# Patient Record
Sex: FEMALE | Race: AMERICAN INDIAN OR ALASKA NATIVE | NOT HISPANIC OR LATINO | Employment: FULL TIME | URBAN - METROPOLITAN AREA
[De-identification: names, ages, dates, MRNs, and addresses within clinical notes are randomized per-mention and may not be internally consistent; named-entity substitution may affect disease eponyms.]

---

## 2024-09-19 ENCOUNTER — APPOINTMENT (OUTPATIENT)
Dept: GENERAL RADIOLOGY | Facility: CLINIC | Age: 49
End: 2024-09-19
Attending: EMERGENCY MEDICINE
Payer: OTHER MISCELLANEOUS

## 2024-09-19 ENCOUNTER — HOSPITAL ENCOUNTER (EMERGENCY)
Facility: CLINIC | Age: 49
Discharge: HOME | End: 2024-09-20
Attending: EMERGENCY MEDICINE | Admitting: EMERGENCY MEDICINE
Payer: OTHER MISCELLANEOUS

## 2024-09-19 DIAGNOSIS — S52.92XA CLOSED FRACTURE OF LEFT FOREARM, INITIAL ENCOUNTER: ICD-10-CM

## 2024-09-19 PROCEDURE — 25605 CLTX DST RDL FX/EPHYS SEP W/: CPT | Mod: LT

## 2024-09-19 PROCEDURE — 73060 X-RAY EXAM OF HUMERUS: CPT | Mod: LT

## 2024-09-19 PROCEDURE — 99285 EMERGENCY DEPT VISIT HI MDM: CPT | Mod: 25

## 2024-09-19 PROCEDURE — 96375 TX/PRO/DX INJ NEW DRUG ADDON: CPT

## 2024-09-19 PROCEDURE — 250N000011 HC RX IP 250 OP 636: Performed by: EMERGENCY MEDICINE

## 2024-09-19 PROCEDURE — 73090 X-RAY EXAM OF FOREARM: CPT | Mod: LT

## 2024-09-19 PROCEDURE — 96374 THER/PROPH/DIAG INJ IV PUSH: CPT

## 2024-09-19 PROCEDURE — 73110 X-RAY EXAM OF WRIST: CPT | Mod: LT

## 2024-09-19 PROCEDURE — 96376 TX/PRO/DX INJ SAME DRUG ADON: CPT

## 2024-09-19 RX ORDER — MORPHINE SULFATE 4 MG/ML
4 INJECTION, SOLUTION INTRAMUSCULAR; INTRAVENOUS
Status: COMPLETED | OUTPATIENT
Start: 2024-09-19 | End: 2024-09-19

## 2024-09-19 RX ORDER — ONDANSETRON 2 MG/ML
4 INJECTION INTRAMUSCULAR; INTRAVENOUS EVERY 30 MIN PRN
Status: DISCONTINUED | OUTPATIENT
Start: 2024-09-19 | End: 2024-09-20 | Stop reason: HOSPADM

## 2024-09-19 RX ORDER — MORPHINE SULFATE 4 MG/ML
4 INJECTION, SOLUTION INTRAMUSCULAR; INTRAVENOUS
Status: COMPLETED | OUTPATIENT
Start: 2024-09-19 | End: 2024-09-20

## 2024-09-19 RX ADMIN — ONDANSETRON 4 MG: 2 INJECTION INTRAMUSCULAR; INTRAVENOUS at 22:58

## 2024-09-19 RX ADMIN — MORPHINE SULFATE 4 MG: 4 INJECTION, SOLUTION INTRAMUSCULAR; INTRAVENOUS at 22:59

## 2024-09-19 ASSESSMENT — ACTIVITIES OF DAILY LIVING (ADL)
ADLS_ACUITY_SCORE: 35
ADLS_ACUITY_SCORE: 35

## 2024-09-19 ASSESSMENT — COLUMBIA-SUICIDE SEVERITY RATING SCALE - C-SSRS
1. IN THE PAST MONTH, HAVE YOU WISHED YOU WERE DEAD OR WISHED YOU COULD GO TO SLEEP AND NOT WAKE UP?: NO
6. HAVE YOU EVER DONE ANYTHING, STARTED TO DO ANYTHING, OR PREPARED TO DO ANYTHING TO END YOUR LIFE?: NO
2. HAVE YOU ACTUALLY HAD ANY THOUGHTS OF KILLING YOURSELF IN THE PAST MONTH?: NO

## 2024-09-20 ENCOUNTER — APPOINTMENT (OUTPATIENT)
Dept: GENERAL RADIOLOGY | Facility: CLINIC | Age: 49
End: 2024-09-20
Attending: EMERGENCY MEDICINE
Payer: OTHER MISCELLANEOUS

## 2024-09-20 VITALS
DIASTOLIC BLOOD PRESSURE: 90 MMHG | HEART RATE: 84 BPM | SYSTOLIC BLOOD PRESSURE: 142 MMHG | TEMPERATURE: 98.4 F | OXYGEN SATURATION: 96 % | HEIGHT: 66 IN | BODY MASS INDEX: 40.82 KG/M2 | WEIGHT: 254 LBS | RESPIRATION RATE: 15 BRPM

## 2024-09-20 PROCEDURE — 999N000179 XR SURGERY CARM FLUORO LESS THAN 5 MIN W STILLS

## 2024-09-20 PROCEDURE — 250N000011 HC RX IP 250 OP 636: Performed by: EMERGENCY MEDICINE

## 2024-09-20 RX ORDER — PROPOFOL 10 MG/ML
INJECTION, EMULSION INTRAVENOUS DAILY PRN
Status: COMPLETED | OUTPATIENT
Start: 2024-09-20 | End: 2024-09-20

## 2024-09-20 RX ORDER — PROPOFOL 10 MG/ML
200 INJECTION, EMULSION INTRAVENOUS
Status: COMPLETED | OUTPATIENT
Start: 2024-09-20 | End: 2024-09-20

## 2024-09-20 RX ORDER — OXYCODONE HYDROCHLORIDE 5 MG/1
5 TABLET ORAL EVERY 6 HOURS PRN
Qty: 12 TABLET | Refills: 0 | Status: SHIPPED | OUTPATIENT
Start: 2024-09-20 | End: 2024-09-23

## 2024-09-20 RX ORDER — ONDANSETRON 4 MG/1
4 TABLET, ORALLY DISINTEGRATING ORAL ONCE
Status: COMPLETED | OUTPATIENT
Start: 2024-09-20 | End: 2024-09-20

## 2024-09-20 RX ADMIN — ONDANSETRON 4 MG: 4 TABLET, ORALLY DISINTEGRATING ORAL at 06:36

## 2024-09-20 RX ADMIN — PROPOFOL 20 MG: 10 INJECTION, EMULSION INTRAVENOUS at 00:24

## 2024-09-20 RX ADMIN — PROPOFOL 40 MG: 10 INJECTION, EMULSION INTRAVENOUS at 00:26

## 2024-09-20 RX ADMIN — PROPOFOL 40 MG: 10 INJECTION, EMULSION INTRAVENOUS at 00:14

## 2024-09-20 RX ADMIN — PROPOFOL 40 MG: 10 INJECTION, EMULSION INTRAVENOUS at 00:13

## 2024-09-20 RX ADMIN — PROPOFOL 20 MG: 10 INJECTION, EMULSION INTRAVENOUS at 00:22

## 2024-09-20 RX ADMIN — PROPOFOL 40 MG: 10 INJECTION, EMULSION INTRAVENOUS at 00:17

## 2024-09-20 RX ADMIN — PROPOFOL 10 MG: 10 INJECTION, EMULSION INTRAVENOUS at 00:19

## 2024-09-20 RX ADMIN — PROPOFOL 40 MG: 10 INJECTION, EMULSION INTRAVENOUS at 00:15

## 2024-09-20 RX ADMIN — MORPHINE SULFATE 4 MG: 4 INJECTION, SOLUTION INTRAMUSCULAR; INTRAVENOUS at 00:00

## 2024-09-20 ASSESSMENT — ACTIVITIES OF DAILY LIVING (ADL)
ADLS_ACUITY_SCORE: 35

## 2024-09-20 NOTE — ED PROVIDER NOTES
"  Emergency Department Note      History of Present Illness     Chief Complaint   Wrist Pain    HPI   Carol Ann Michael is a 49 year old female who presents to the ER for left wrist pain. Patient reports missing a step and falling onto her left wrist. She states that her whole arm is in pain and that the base of her wrist hurts the most. She recalls being given 150 mcg of fentanyl by EMS and denies hitting her head or blood thinners use.     Independent Historian   None    Review of External Notes   None    Past Medical History     Medical History and Problem List   The patient denies any significant past medical history.     Medications   The patient is not currently taking any regular medications.    Physical Exam     Patient Vitals for the past 24 hrs:   BP Temp Temp src Pulse Resp SpO2 Height Weight   09/20/24 0045 (!) 159/91 -- -- 85 13 99 % -- --   09/20/24 0035 133/84 -- -- 87 17 99 % -- --   09/20/24 0030 (!) 149/89 -- -- 92 19 98 % -- --   09/20/24 0020 (!) 142/89 -- -- 93 21 100 % -- --   09/20/24 0010 (!) 142/85 -- -- 81 11 99 % -- --   09/19/24 2215 -- -- -- -- -- 99 % -- --   09/19/24 2156 (!) 144/116 98.4  F (36.9  C) Oral 80 20 98 % 1.676 m (5' 6\") 115.2 kg (254 lb)     Physical Exam  General: Laying on the ED bed  HEENT: Normocephalic, atraumatic  Cardiac: Warm and well perfused, regular rate and rhythm, left radial 2+  Pulm: Breathing comfortably, no accessory muscle usage, no conversational dyspnea  MSK: There is bony deformity of the left distal forearm and corresponding tenderness to palpation.  2+ capillary refill to the left hand fingers.  Skin: Warm and dry  Neuro: Sensation intact to the distal left hand fingers.  Difficulty moving left hand fingers.  Psych: Pleasant mood and affect      Diagnostics     Lab Results   Labs Ordered and Resulted from Time of ED Arrival to Time of ED Departure - No data to display    Imaging   Humerus XR,  G/E 2 views, left   Final Result   IMPRESSION: Distal " radius fracture with marked dorsal displacement of the distal fragment with overriding. Fracture involves the distal radial ulnar junction and there is malalignment distal ulna relative to the distal radius.      No fracture in the proximal radius or ulna. No fracture of the humerus.      Radius/Ulna XR,  PA &LAT, left   Final Result   IMPRESSION: Distal radius fracture with marked dorsal displacement of the distal fragment with overriding. Fracture involves the distal radial ulnar junction and there is malalignment distal ulna relative to the distal radius.      No fracture in the proximal radius or ulna. No fracture of the humerus.      XR Wrist Left G/E 3 Views   Final Result   IMPRESSION: Distal radius fracture with marked dorsal displacement of the distal fragment with overriding. Fracture involves the distal radial ulnar junction and there is malalignment distal ulna relative to the distal radius.      No fracture in the proximal radius or ulna. No fracture of the humerus.      XR Surgery OVI L/T 5 Min Fluoro w Stills    (Results Pending)     Independent Interpretation   X-ray left wrist shows a distal left radial fracture and distraction of the ulna from the radius distally    ED Course      Medications Administered   Medications   ondansetron (ZOFRAN) injection 4 mg (4 mg Intravenous $Given 9/19/24 2258)   propofol (DIPRIVAN) injection 10 mg/mL vial (has no administration in time range)   propofol (DIPRIVAN) injection 10 mg/mL vial (40 mg Intravenous $Given 9/20/24 0026)   morphine (PF) injection 4 mg (4 mg Intravenous $Given 9/19/24 2259)   morphine (PF) injection 4 mg (4 mg Intravenous $Given 9/20/24 0000)       Procedures     Splint Placement     Procedure: Splint Placement     Indication: Fracture and Pain    Consent: Verbal     Location: Left Wrist    Preparation: Wounds were cleansed and dressed with a non-adherent bandage     Procedure detail:   Splint was applied by Tech/Nurse with my  assistance  Splint type: Sugar-tong   Splint materilal: Plaster  After placement I checked and adjusted the fit as needed to ensure proper positioning/fit.   Sensation and circulation are intact after splint placement.     Patient Status: The patient tolerated the procedure well: Yes. There were no complications.     Sedation     Procedure: Procedural Sedation    Sedation Level: Moderate    Indication: Fracture reduction    Consent: Verbal from Patient     Universal protocol: Universal protocol was followed and time out conducted just prior to starting procedure, confirming patient identity, site/side, procedure, patient position, and availability of correct equipment and implants.     Last PO Intake: Emergent procedure    ASA Class: Class 1 - A normal healthy patient     Exam:  Mallampati:  Grade 3 - Soft palate and base of uvula present   Lungs: Clear   Heart: Regular rate and rhythm     Medication: Propofol  Dose: 250 mg     Monitoring:  Monitoring consisted of heart rate, cardiac, continuous pulse oximetry, frequent blood pressure checks.   There was constant attendance by RN until patient recovered and constant attendance by physician until patient stable.   Intubation and emergency airway equipment available.     Response: Vital signs stable, oxygen saturations greater than 92%       Patient Status: Post procedure patient was alert.     Total Physician Drug Administration / Monitoring Time: 15 minutes.     Patient was monitored during recovery and returned to pre-procedure baseline.        Fracture Reduction     Procedure: Fracture Reduction     Indication: Left distal forearm fracture    Location: Left wrist    Consent: Written from Patient   Risks (including but not limited to: neurologic compromise, vascular injury, pain, and inability to reduce fracture), benefits, and alternatives were discussed with patient and consent for procedure was obtained.     Universal Protocol: Universal protocol was followed  and time out conducted just prior to starting procedure, confirming patient identity, site/side, procedure, patient position, and availability of correct equipment and implants.     Anesthesia/Sedation: Sedation: The patient was sedated, see separate procedure note for details.     Procedure Detail: I manually manipulated and reduced the fracture. C-arm was used.    Immobilized with: Custom splint (See separate procedure note)  Post-reduction x-ray showed adequate reduction   Post-procedure distal neurovascular function was intact.     Patient Status:  The patient tolerated the procedure well: Yes. There were no complications.      Discussion of Management   None    ED Course   ED Course as of 09/20/24 0051   Thu Sep 19, 2024   2210 I obtained the history and examined the patient as noted above.    2333 I rechecked patient and explained findings and plan of care.         Additional Documentation  None    Medical Decision Making / Diagnosis     CMS Diagnoses: None    MIPS       None    MDM   Carol Ann Michael is a 49 year old female who presents after mechanical fall and resultant left wrist injury as above.  X-ray confirms a left wrist fracture as described above.  The patient was unable to move her fingers on my initial evaluation, though she was neurovascularly intact.  Sedation proceeded as above and the patient was able to move her left hand fingers after reduction.  I suspect the initial difficulty was due to mechanical stretch of the flexor tendons with the amount of dorsal displacement of the distal radius fracture.  With the improvement in her motor exam, I do think that she is stable for further fracture management in the outpatient setting.  The patient was waking up from sedation at the time of signout, signed out to my partner as below pending discharge after she has passed a trial of ambulation and p.o. challenge.  Plan is for outpatient follow-up with the orthopedic clinic.    Disposition   The patient  was signed out to my partner Dr. Tinsley pending p.o. challenge and trial ambulation post sedation with anticipation of discharge thereafter.    Diagnosis     ICD-10-CM    1. Closed fracture of left forearm, initial encounter  S52.92XA            Discharge Medications   New Prescriptions    OXYCODONE (ROXICODONE) 5 MG TABLET    Take 1 tablet (5 mg) by mouth every 6 hours as needed for pain.         Scribe Disclosure:  I, Lester Kennedy, am serving as a scribe at 10:15 PM on 9/19/2024 to document services personally performed by Keenan Barriga MD based on my observations and the provider's statements to me.        Keenan Barriga MD  09/20/24 0051

## 2024-09-20 NOTE — ED NOTES
Bed: ED01  Expected date:   Expected time:   Means of arrival:   Comments:  [facility] 49F left wrist deformity, fentanyl given

## 2024-09-20 NOTE — ED TRIAGE NOTES
Pt BIBA from work after falling while missing step. Pt attempted to brace fall with L hand. Pt has swelling and severe pain in L wrist. EMS noted wrist looked deformed when they arrived to scene. Karl splint in place. EMS gave total of 150mcg fentanyl.      Triage Assessment (Adult)       Row Name 09/19/24 4247          Triage Assessment    Airway WDL WDL        Respiratory WDL    Respiratory WDL WDL        Cardiac WDL    Cardiac WDL WDL        Peripheral/Neurovascular WDL    Peripheral Neurovascular WDL WDL        Cognitive/Neuro/Behavioral WDL    Cognitive/Neuro/Behavioral WDL WDL

## 2024-09-25 ENCOUNTER — OFFICE VISIT (OUTPATIENT)
Dept: FAMILY MEDICINE | Facility: CLINIC | Age: 49
End: 2024-09-25

## 2024-09-25 VITALS
HEART RATE: 80 BPM | TEMPERATURE: 97.5 F | SYSTOLIC BLOOD PRESSURE: 128 MMHG | WEIGHT: 255 LBS | HEIGHT: 66 IN | DIASTOLIC BLOOD PRESSURE: 84 MMHG | RESPIRATION RATE: 20 BRPM | BODY MASS INDEX: 40.98 KG/M2

## 2024-09-25 DIAGNOSIS — K21.00 GASTROESOPHAGEAL REFLUX DISEASE WITH ESOPHAGITIS, UNSPECIFIED WHETHER HEMORRHAGE: ICD-10-CM

## 2024-09-25 DIAGNOSIS — Z91.09 ENVIRONMENTAL ALLERGIES: ICD-10-CM

## 2024-09-25 DIAGNOSIS — Z01.818 PREOPERATIVE EXAMINATION: Primary | ICD-10-CM

## 2024-09-25 DIAGNOSIS — Z71.89 ACP (ADVANCE CARE PLANNING): ICD-10-CM

## 2024-09-25 DIAGNOSIS — S62.102K CLOSED FRACTURE OF LEFT WRIST WITH NONUNION, SUBSEQUENT ENCOUNTER: ICD-10-CM

## 2024-09-25 DIAGNOSIS — D64.89 ANEMIA DUE TO OTHER CAUSE, NOT CLASSIFIED: ICD-10-CM
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
## 2024-09-25 LAB
% GRANULOCYTES: 63.6 %
BUN SERPL-MCNC: 10 MG/DL (ref 7–25)
BUN/CREATININE RATIO: 12 (ref 6–32)
CALCIUM SERPL-MCNC: 10 MG/DL (ref 8.6–10.3)
CHLORIDE SERPLBLD-SCNC: 105.3 MMOL/L (ref 98–110)
CO2 SERPL-SCNC: 26.6 MMOL/L (ref 20–32)
CREAT SERPL-MCNC: 0.82 MG/DL (ref 0.6–1.3)
GLUCOSE SERPL-MCNC: 88 MG/DL (ref 60–99)
HCT VFR BLD AUTO: 33.8 % (ref 35–47)
HEMOGLOBIN: 10.6 G/DL (ref 11.7–15.7)
LYMPHOCYTES NFR BLD AUTO: 26.9 %
MCH RBC QN AUTO: 24.1 PG (ref 26–33)
MCHC RBC AUTO-ENTMCNC: 31.4 G/DL (ref 31–36)
MCV RBC AUTO: 77.1 FL (ref 78–100)
MONOCYTES NFR BLD AUTO: 9.5 %
PLATELET COUNT - QUEST: 349 10^9/L (ref 150–375)
POTASSIUM SERPL-SCNC: 4.37 MMOL/L (ref 3.5–5.3)
RBC # BLD AUTO: 4.39 10*12/L (ref 3.8–5.2)
SODIUM SERPL-SCNC: 139.5 MMOL/L (ref 135–146)
WBC # BLD AUTO: 6.6 10*9/L (ref 4–11)

## 2024-09-25 PROCEDURE — 80048 BASIC METABOLIC PNL TOTAL CA: CPT | Performed by: FAMILY MEDICINE

## 2024-09-25 PROCEDURE — 36415 COLL VENOUS BLD VENIPUNCTURE: CPT | Performed by: FAMILY MEDICINE

## 2024-09-25 PROCEDURE — 99204 OFFICE O/P NEW MOD 45 MIN: CPT | Performed by: FAMILY MEDICINE

## 2024-09-25 PROCEDURE — 85025 COMPLETE CBC W/AUTO DIFF WBC: CPT | Performed by: FAMILY MEDICINE

## 2024-09-25 RX ORDER — LORATADINE 10 MG/1
10 TABLET ORAL DAILY
COMMUNITY

## 2024-09-25 RX ORDER — ASPIRIN 81 MG/1
81 TABLET ORAL DAILY
COMMUNITY
Start: 2024-09-25

## 2024-09-25 NOTE — PROGRESS NOTES
Preoperative Evaluation  [facility]  [address] [place] [address]  [address]  [facility] [address]  Phone: [phone]  Fax: [phone]  Primary Provider: Physician No Ref-Primary  Pre-op Performing Provider: Debo Pina MD  Sep 25, 2024               9/25/2024   Surgical Information   What procedure is being done? left wrist   Facility or Hospital where procedure/surgery will be performed: [facility] [place]   Who is doing the procedure / surgery? Dr Cruz   Date of surgery / procedure: 9/27/24   Time of surgery / procedure: am   Where do you plan to recover after surgery? at home with family      Fax number for surgical facility:     Assessment & Plan     The proposed surgical procedure is considered LOW risk.    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 Preoperative examination    - VENOUS COLLECTION  - Basic Metabolic Panel (BFP)  - HEMOGRAM PLATELET DIFF (BFP)    2. ACP (advance care planning)      3. 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 Gastroesophageal reflux disease with esophagitis, unspecified whether hemorrhage      5. Environmental allergies      6. Work related injury      7. Anemia due to other cause, not classified  Followup on this for further discussed.          - No identified  additional risk factors other than previously addressed    Antiplatelet or Anticoagulation Medication Instructions   - aspirin: Discontinue aspirin 7-10 days prior to procedure to reduce bleeding risk. It should be resumed postoperatively.     Additional Medication Instructions  Take all scheduled medications on the day of surgery    Recommendation  Approval given to proceed with proposed procedure, without further diagnostic evaluation.    Kristie Maharaj is a 49 year old, presenting for the following:  New Patient, Pre-Op Exam, and Work Comp        HPI related to upcoming procedure: here for preop for left wrist surgery.;needs a screw and bolt, broke it on Thursday on September 19th. Has pain and swelling. Thinks the swelling is down but it's still broken, it looks deformed.        9/25/2024   Pre-Op Questionnaire   Have you ever had a heart attack or stroke? No   Have you ever had surgery on your heart or blood vessels, such as a stent placement, a coronary artery bypass, or surgery on an artery in your head, neck, heart, or legs? No   Do you have chest pain with activity? No   Do you have a history of heart failure? No   Do you currently have a cold, bronchitis or symptoms of other infection? No   Do you or anyone in your family have previous history of blood clots? No   Do you or does anyone in your family have a serious bleeding problem such as prolonged bleeding following surgeries or cuts? No   Have you ever had problems with anemia or been told to take iron pills? (!) YES when she was younger   Have you had any abnormal blood loss such as black, tarry or bloody stools, or abnormal vaginal bleeding? No   Have you ever had a blood transfusion? No   Are you willing to have a blood transfusion if it is medically needed before, during, or after your surgery? Yes   Have you or any of your relatives ever had problems with anesthesia? No   Do you have sleep apnea, excessive snoring or daytime drowsiness? No   Do  you have any artifical heart valves or other implanted medical devices like a pacemaker, defibrillator, or continuous glucose monitor? No   Do you have artificial joints? No   Are you allergic to latex? No      Health Care Directive  Patient does not have a Health Care Directive or Living Will: Discussed advance care planning with patient; information given to patient to review.    Preoperative Review of    reviewed - was given few oxydone but stopped taking it because it made her nauseated.          Patient Active Problem List    Diagnosis Date Noted    ACP (advance care planning) 09/25/2024     Priority: Medium    Gastroesophageal reflux disease with esophagitis, unspecified whether hemorrhage 09/25/2024     Priority: Medium    Environmental allergies 09/25/2024     Priority: Medium    Anemia due to other cause, not classified 09/25/2024     Priority: Medium      Past Medical History:   Diagnosis Date    Environmental allergies 09/25/2024    Gastroesophageal reflux disease with esophagitis, unspecified whether hemorrhage 09/25/2024     History reviewed. No pertinent surgical history.  Current Outpatient Medications   Medication Sig Dispense Refill    aspirin 81 MG EC tablet Take 1 tablet (81 mg) by mouth daily.      loratadine (CLARITIN) 10 MG tablet Take 10 mg by mouth daily.      omeprazole (PRILOSEC) 20 MG DR capsule Take 20 mg by mouth daily.         Allergies   Allergen Reactions    Penicillins Shortness Of Breath        Social History     Tobacco Use    Smoking status: Former     Types: Cigarettes     Passive exposure: Never    Smokeless tobacco: Never   Substance Use Topics    Alcohol use: Not on file     Family History   Problem Relation Age of Onset    No Known Problems Mother     Myocardial Infarction Father     Heart Disease Maternal Grandmother     Arthritis Paternal Grandmother     Diabetes Type 2  Paternal Grandmother     Myocardial Infarction Maternal Uncle      History   Drug Use Not on file  "            Review of Systems  Constitutional, HEENT, cardiovascular, pulmonary, gi and gu systems are negative, except as otherwise noted.    Objective    /84 (BP Location: Right arm, Patient Position: Chair, Cuff Size: Adult Large)   Pulse 80   Temp 97.5  F (36.4  C) (Temporal)   Resp 20   Ht 1.676 m (5' 6\")   Wt 115.7 kg (255 lb)   BMI 41.16 kg/m     Estimated body mass index is 41.16 kg/m  as calculated from the following:    Height as of this encounter: 1.676 m (5' 6\").    Weight as of this encounter: 115.7 kg (255 lb).  Physical Exam  GENERAL: alert and no distress  EYES: Eyes grossly normal to inspection, PERRL and conjunctivae and sclerae normal  HENT: ear canals and TM's normal, nose and mouth without ulcers or lesions  NECK: no adenopathy, no asymmetry, masses, or scars  RESP: lungs clear to auscultation - no rales, rhonchi or wheezes  CV: regular rate and rhythm, normal S1 S2, no S3 or S4, no murmur, click or rub, no peripheral edema  ABDOMEN: soft, nontender, no hepatosplenomegaly, no masses and bowel sounds normal  MS: no gross musculoskeletal defects noted, no edema  PSYCH: mentation appears normal, affect normal/bright  Left arm casted    No results for input(s): \"HGB\", \"PLT\", \"INR\", \"NA\", \"POTASSIUM\", \"CR\", \"A1C\" in the last 8760 hours.     Diagnostics  Recent Results (from the past 720 hour(s))   Basic Metabolic Panel (BFP)    Collection Time: 09/25/24 12:00 AM   Result Value Ref Range    Carbon Dioxide 26.6 20 - 32 mmol/L    Creatinine 0.82 0.60 - 1.30 mg/dL    Glucose 88 60 - 99 mg/dL    Sodium 139.5 135 - 146 mmol/L    Potassium 4.37 3.5 - 5.3 mmol/L    Chloride 105.3 98 - 110 mmol/L    Urea Nitrogen 10 7 - 25 mg/dL    Calcium 10.0 8.6 - 10.3 mg/dL    BUN/Creatinine Ratio 12 6 - 32   HEMOGRAM PLATELET DIFF (BFP)    Collection Time: 09/25/24  5:00 PM   Result Value Ref Range    WBC 6.6 4.0 - 11 10*9/L    RBC Count 4.39 3.8 - 5.2 10*12/L    Hemoglobin 10.6 (A) 11.7 - 15.7 g/dL    " Hematocrit 33.8 (A) 35.0 - 47.0 %    MCV 77.1 (A) 78 - 100 fL    MCH 24.1 (A) 26 - 33 pg    MCHC 31.4 31 - 36 g/dL    Platelet Count 349 150 - 375 10^9/L    % Granulocytes 63.6 %    % Lymphocytes 26.9 %    % Monocytes 9.5 %          Revised Cardiac Risk Index (RCRI)  The patient has the following serious cardiovascular risks for perioperative complications:   - No serious cardiac risks = 0 points     RCRI Interpretation: 0 points: Class I (very low risk - 0.4% complication rate)         Signed Electronically by: Debo Pina MD  A copy of this evaluation report is provided to the requesting physician.

## 2024-09-25 NOTE — LETTER
9/25/2024      Carol Ann Manzano  [address]  [facility] [address]        Preoperative Evaluation  [facility]  [address]  [address]  [facility] [address]  Phone: [phone]  Fax: [phone]  Primary Provider: Physician No Ref-Primary  Pre-op Performing Provider: Debo Pina MD  Sep 25, 2024               9/25/2024   Surgical Information   What procedure is being done? left wrist   Facility or Hospital where procedure/surgery will be performed: [facility]   Who is doing the procedure / surgery? Dr Cruz   Date of surgery / procedure: 9/27/24   Time of surgery / procedure: am   Where do you plan to recover after surgery? at home with family      Fax number for surgical facility:     Assessment & Plan    The proposed surgical procedure is considered LOW risk.    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 Preoperative examination    - VENOUS COLLECTION  - Basic Metabolic Panel (BFP)  - HEMOGRAM PLATELET DIFF (BFP)    2. ACP (advance care planning)      3. 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 Gastroesophageal reflux disease with esophagitis, unspecified whether hemorrhage      5. Environmental allergies      6. Work related injury      7. Anemia due to other cause,  not classified  Followup on this for further discussed.          - No identified additional risk factors other than previously addressed    Antiplatelet or Anticoagulation Medication Instructions   - aspirin: Discontinue aspirin 7-10 days prior to procedure to reduce bleeding risk. It should be resumed postoperatively.     Additional Medication Instructions  Take all scheduled medications on the day of surgery    Recommendation  Approval given to proceed with proposed procedure, without further diagnostic evaluation.    Kristie Maharaj is a 49 year old, presenting for the following:  New Patient, Pre-Op Exam, and Work Comp        HPI related to upcoming procedure: here for preop for left wrist surgery.;needs a screw and bolt, broke it on Thursday on September 19th. Has pain and swelling. Thinks the swelling is down but it's still broken, it looks deformed.        9/25/2024   Pre-Op Questionnaire   Have you ever had a heart attack or stroke? No   Have you ever had surgery on your heart or blood vessels, such as a stent placement, a coronary artery bypass, or surgery on an artery in your head, neck, heart, or legs? No   Do you have chest pain with activity? No   Do you have a history of heart failure? No   Do you currently have a cold, bronchitis or symptoms of other infection? No   Do you or anyone in your family have previous history of blood clots? No   Do you or does anyone in your family have a serious bleeding problem such as prolonged bleeding following surgeries or cuts? No   Have you ever had problems with anemia or been told to take iron pills? (!) YES when she was younger   Have you had any abnormal blood loss such as black, tarry or bloody stools, or abnormal vaginal bleeding? No   Have you ever had a blood transfusion? No   Are you willing to have a blood transfusion if it is medically needed before, during, or after your surgery? Yes   Have you or any of your relatives ever had problems with  anesthesia? No   Do you have sleep apnea, excessive snoring or daytime drowsiness? No   Do you have any artifical heart valves or other implanted medical devices like a pacemaker, defibrillator, or continuous glucose monitor? No   Do you have artificial joints? No   Are you allergic to latex? No      Health Care Directive  Patient does not have a Health Care Directive or Living Will: Discussed advance care planning with patient; information given to patient to review.    Preoperative Review of    reviewed - was given few oxydone but stopped taking it because it made her nauseated.          Patient Active Problem List    Diagnosis Date Noted     ACP (advance care planning) 09/25/2024     Priority: Medium     Gastroesophageal reflux disease with esophagitis, unspecified whether hemorrhage 09/25/2024     Priority: Medium     Environmental allergies 09/25/2024     Priority: Medium     Anemia due to other cause, not classified 09/25/2024     Priority: Medium      Past Medical History:   Diagnosis Date     Environmental allergies 09/25/2024     Gastroesophageal reflux disease with esophagitis, unspecified whether hemorrhage 09/25/2024     History reviewed. No pertinent surgical history.  Current Outpatient Medications   Medication Sig Dispense Refill     aspirin 81 MG EC tablet Take 1 tablet (81 mg) by mouth daily.       loratadine (CLARITIN) 10 MG tablet Take 10 mg by mouth daily.       omeprazole (PRILOSEC) 20 MG DR capsule Take 20 mg by mouth daily.         Allergies   Allergen Reactions     Penicillins Shortness Of Breath        Social History     Tobacco Use     Smoking status: Former     Types: Cigarettes     Passive exposure: Never     Smokeless tobacco: Never   Substance Use Topics     Alcohol use: Not on file     Family History   Problem Relation Age of Onset     No Known Problems Mother      Myocardial Infarction Father      Heart Disease Maternal Grandmother      Arthritis Paternal Grandmother       "Diabetes Type 2  Paternal Grandmother      Myocardial Infarction Maternal Uncle      History   Drug Use Not on file             Review of Systems  Constitutional, HEENT, cardiovascular, pulmonary, gi and gu systems are negative, except as otherwise noted.    Objective   /84 (BP Location: Right arm, Patient Position: Chair, Cuff Size: Adult Large)   Pulse 80   Temp 97.5  F (36.4  C) (Temporal)   Resp 20   Ht 1.676 m (5' 6\")   Wt 115.7 kg (255 lb)   BMI 41.16 kg/m     Estimated body mass index is 41.16 kg/m  as calculated from the following:    Height as of this encounter: 1.676 m (5' 6\").    Weight as of this encounter: 115.7 kg (255 lb).  Physical Exam  GENERAL: alert and no distress  EYES: Eyes grossly normal to inspection, PERRL and conjunctivae and sclerae normal  HENT: ear canals and TM's normal, nose and mouth without ulcers or lesions  NECK: no adenopathy, no asymmetry, masses, or scars  RESP: lungs clear to auscultation - no rales, rhonchi or wheezes  CV: regular rate and rhythm, normal S1 S2, no S3 or S4, no murmur, click or rub, no peripheral edema  ABDOMEN: soft, nontender, no hepatosplenomegaly, no masses and bowel sounds normal  MS: no gross musculoskeletal defects noted, no edema  PSYCH: mentation appears normal, affect normal/bright  Left arm casted    No results for input(s): \"HGB\", \"PLT\", \"INR\", \"NA\", \"POTASSIUM\", \"CR\", \"A1C\" in the last 8760 hours.     Diagnostics  Recent Results (from the past 720 hour(s))   Basic Metabolic Panel (BFP)    Collection Time: 09/25/24 12:00 AM   Result Value Ref Range    Carbon Dioxide 26.6 20 - 32 mmol/L    Creatinine 0.82 0.60 - 1.30 mg/dL    Glucose 88 60 - 99 mg/dL    Sodium 139.5 135 - 146 mmol/L    Potassium 4.37 3.5 - 5.3 mmol/L    Chloride 105.3 98 - 110 mmol/L    Urea Nitrogen 10 7 - 25 mg/dL    Calcium 10.0 8.6 - 10.3 mg/dL    BUN/Creatinine Ratio 12 6 - 32   HEMOGRAM PLATELET DIFF (BFP)    Collection Time: 09/25/24  5:00 PM   Result Value Ref " Range    WBC 6.6 4.0 - 11 10*9/L    RBC Count 4.39 3.8 - 5.2 10*12/L    Hemoglobin 10.6 (A) 11.7 - 15.7 g/dL    Hematocrit 33.8 (A) 35.0 - 47.0 %    MCV 77.1 (A) 78 - 100 fL    MCH 24.1 (A) 26 - 33 pg    MCHC 31.4 31 - 36 g/dL    Platelet Count 349 150 - 375 10^9/L    % Granulocytes 63.6 %    % Lymphocytes 26.9 %    % Monocytes 9.5 %          Revised Cardiac Risk Index (RCRI)  The patient has the following serious cardiovascular risks for perioperative complications:   - No serious cardiac risks = 0 points     RCRI Interpretation: 0 points: Class I (very low risk - 0.4% complication rate)         Signed Electronically by: Debo Pina MD  A copy of this evaluation report is provided to the requesting physician.             Sincerely,        Debo Pina MD

## 2024-10-13 ENCOUNTER — HEALTH MAINTENANCE LETTER (OUTPATIENT)
Age: 49
End: 2024-10-13

## 2024-12-16 ENCOUNTER — TELEPHONE (OUTPATIENT)
Dept: FAMILY MEDICINE | Facility: CLINIC | Age: 49
End: 2024-12-16

## 2025-01-29 ENCOUNTER — TELEPHONE (OUTPATIENT)
Dept: FAMILY MEDICINE | Facility: CLINIC | Age: 50
End: 2025-01-29

## 2025-03-04 ENCOUNTER — TRANSCRIBE ORDERS (OUTPATIENT)
Dept: OTHER | Age: 50
End: 2025-03-04

## 2025-03-04 DIAGNOSIS — M54.50 CHRONIC BILATERAL LOW BACK PAIN WITHOUT SCIATICA: Primary | ICD-10-CM

## 2025-03-04 DIAGNOSIS — G89.29 CHRONIC BILATERAL LOW BACK PAIN WITHOUT SCIATICA: Primary | ICD-10-CM
